# Patient Record
Sex: FEMALE | Race: WHITE | NOT HISPANIC OR LATINO | Employment: STUDENT | URBAN - METROPOLITAN AREA
[De-identification: names, ages, dates, MRNs, and addresses within clinical notes are randomized per-mention and may not be internally consistent; named-entity substitution may affect disease eponyms.]

---
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
## 2018-01-25 ENCOUNTER — EVALUATION (OUTPATIENT)
Dept: PHYSICAL THERAPY | Facility: CLINIC | Age: 18
End: 2018-01-25
Payer: COMMERCIAL

## 2018-01-25 DIAGNOSIS — M25.552 LEFT HIP PAIN: Primary | ICD-10-CM

## 2018-01-25 PROCEDURE — 97161 PT EVAL LOW COMPLEX 20 MIN: CPT | Performed by: PHYSICAL THERAPIST

## 2018-01-25 PROCEDURE — G8978 MOBILITY CURRENT STATUS: HCPCS | Performed by: PHYSICAL THERAPIST

## 2018-01-25 PROCEDURE — 97140 MANUAL THERAPY 1/> REGIONS: CPT | Performed by: PHYSICAL THERAPIST

## 2018-01-25 PROCEDURE — G8979 MOBILITY GOAL STATUS: HCPCS | Performed by: PHYSICAL THERAPIST

## 2018-01-25 RX ORDER — NORETHINDRONE ACETATE AND ETHINYL ESTRADIOL 1MG-20(21)
1 KIT ORAL DAILY
COMMUNITY

## 2018-01-25 NOTE — PROGRESS NOTES
PT Evaluation     Today's date: 2018  Patient name: Rosio Bermeo  : 2000  MRN: [id number]  Referring provider: Davonte Lei PT  Dx:   Encounter Diagnosis   Name Primary?  Left hip pain Yes                  Assessment  Impairments: abnormal muscle firing, abnormal or restricted ROM and impaired physical strength  Functional limitations: running > 2 miles without pain  Patient is irritable  Assessment details: Patient is a 21year old female who presents to physical therapy with left hip pain  Patient will benefit from skilled treatment intervention to resolve her impairments and to maximize function  Barriers to therapy: returns to school in 1 week  Understanding of Dx/Px/POC: excellent  Goals  Resolve pain with running > 2 miles    Plan  Patient would benefit from: skilled PT  Referral necessary: No  Planned modality interventions: thermotherapy: hydrocollator packs  Planned therapy interventions: joint mobilization, manual therapy, motor coordination training, neuromuscular re-education, therapeutic exercise and strengthening  Frequency: 3x week  Duration in visits: 12  Duration in weeks: 4  Treatment plan discussed with: patient        Subjective Evaluation    History of Present Illness  Date of onset: 2018  Mechanism of injury: Patient had a stress fracture of her left  Tibia in 2016  MRI of the left Tibia was performed in fall of   Recently she developed left hip with running for endurance training  She ran 2 miles and pain developed soon thereafter  Pain is described as sharp and located over the posterior thigh and glute  She is a mid-fielder in [employer]  Quality of life: excellent    Pain  Current pain ratin  At best pain ratin  At worst pain ratin      Diagnostic Tests  No diagnostic tests performed  Treatments  No previous or current treatments  Patient Goals  Patient goals for therapy: return to sport/leisure activities and decreased pain          Objective     Static Posture     Comments      Standing Posture: lumbar lordosis with right side bending noted   Sitting posture: right illiac crest is higher    Palpation: severe tenderness and spasm of bilateral psoas right > left,  Obturator internus tender on left    Active/Passive ROM  Lumbar AROM:flexion deviates to the right, extension hinges at L5  Hip:left hip IR <10 degrees      Joint Mobility  Hip:severe limitation in inferior, posterior and lateral mobility of the left hip  Innominate:right hypomobile    Manual Muscle Test  Hip: WNL    Motor Control  Hip Extension: WNL  Lower Extremity Integration Test - mild inefficiency b/l    Functional Tests - not tested today      Special Tests  Forward Flexion Test:right innominate hypomobile  Marchers test:right innominate hypomobile   Vertical Compression Test:3+/5  Slump Test: negative  Straight Leg Test: negative  Anterior Hip Impingement Test:positive on left  Khoa Test mild limitation on right  Prone Instability Test: positve at L4/5 L5/S1  Clint Test: right tighter than left   Muscle Length: piriformis tightness b/l      Precautions: none    Daily Treatment Diary     Manual              STM to bilateral psoas and left piriformis    Left hip IR stretch                                                                     Exercise Diary                           Standing piriformis stretch Left 2 min                                                                                                                                                                                                                                                          Modalities                                                               Flowsheet Rows    Flowsheet Row Most Recent Value   PT G-Codes   Current Score  79   Projected Score  90   FOTO information reviewed  Yes   Assessment Type  Evaluation   G code set  Mobility: Walking & Moving Around   Mobility: Walking and Moving Around Current Status ()  CJ   Mobility: Walking and Moving Around Goal Status ()  CJ

## 2018-01-25 NOTE — PROGRESS NOTES
Daily Note     Today's date: 2018  Patient name: Madeline Herrera  : 2000  MRN: [id number]  Referring provider: Robles Oakes PT  Dx:   Encounter Diagnosis   Name Primary?  Left hip pain Yes                  Subjective: see IE      Objective: See treatment diary below      Assessment: Tolerated treatment well  Patient could benefit from continued PT      Plan: Progress treatment as tolerated          Manual              STM to bilateral psoas and left piriformis   Left hip IR stretch                                                                   Exercise Diary                           Standing piriformis stretch Left 2 min                                                                                                                                                                                                                                                        Modalities

## 2018-01-26 ENCOUNTER — OFFICE VISIT (OUTPATIENT)
Dept: PHYSICAL THERAPY | Facility: CLINIC | Age: 18
End: 2018-01-26
Payer: COMMERCIAL

## 2018-01-26 DIAGNOSIS — M25.552 LEFT HIP PAIN: Primary | ICD-10-CM

## 2018-01-26 PROCEDURE — 97140 MANUAL THERAPY 1/> REGIONS: CPT | Performed by: PHYSICAL THERAPIST

## 2018-01-26 NOTE — PROGRESS NOTES
Daily Note     Today's date: 2018  Patient name: Willa Li  : 2000  MRN: [id number]  Referring provider: Rebeca Ruffin PT  Dx:   Encounter Diagnosis   Name Primary?  Left hip pain Yes            Precautions: none       Subjective: No significant change      Objective: See treatment diary below      Assessment: Tolerated treatment well  Patient exhibited good technqiue with therapeutic exercises  Reports significant improvement post tx      Plan: Progress treatment as tolerated           Daily Treatment Diary     Manual             STM to bilateral psoas and left piriformis    Left hip IR stretch  15 min                                                                   Exercise Diary                           Standing piriformis stretch Left 2 min Left 2 min                                                                                                                                                                                                                                                         Modalities              MHP  15 min

## 2018-01-29 ENCOUNTER — OFFICE VISIT (OUTPATIENT)
Dept: PHYSICAL THERAPY | Facility: CLINIC | Age: 18
End: 2018-01-29
Payer: COMMERCIAL

## 2018-01-29 DIAGNOSIS — M25.552 LEFT HIP PAIN: Primary | ICD-10-CM

## 2018-01-29 PROCEDURE — 97140 MANUAL THERAPY 1/> REGIONS: CPT | Performed by: PHYSICAL THERAPIST

## 2018-01-29 PROCEDURE — 97010 HOT OR COLD PACKS THERAPY: CPT | Performed by: PHYSICAL THERAPIST

## 2018-01-29 NOTE — PROGRESS NOTES
Daily Note     Today's date: 2018  Patient name: Chuck Magdaleno  : 2000  MRN: [id number]  Referring provider: Marah García PT  Dx:   Encounter Diagnosis   Name Primary?  Left hip pain Yes                  Subjective: Increased abdominal soreness after her initial treatment which began the next day  Reports decreased left hip pain post tx for 12 hours  Objective: See treatment diary below      Assessment: Tolerated treatment well  Patient exhibited good technqiue with therapeutic exercises      Plan: Progress treatment as tolerated         Daily Treatment Diary      Manual                   STM to bilateral psoas and left piriformis     Left hip IR stretch    STM to medial left knee/hamstring   15 min  20 min                                                                                                                       Exercise Diary                                                Standing piriformis stretch Left 2 min Left 2 min  left 2 min                                                                                                                                                                                                                                                                                                                                                                                                                                                                       Modalities                        MHP   15 min  15 min

## 2018-01-31 ENCOUNTER — OFFICE VISIT (OUTPATIENT)
Dept: PHYSICAL THERAPY | Facility: CLINIC | Age: 18
End: 2018-01-31
Payer: COMMERCIAL

## 2018-01-31 DIAGNOSIS — M25.552 LEFT HIP PAIN: Primary | ICD-10-CM

## 2018-01-31 PROCEDURE — 97010 HOT OR COLD PACKS THERAPY: CPT | Performed by: PHYSICAL THERAPIST

## 2018-01-31 PROCEDURE — 97140 MANUAL THERAPY 1/> REGIONS: CPT | Performed by: PHYSICAL THERAPIST

## 2018-01-31 PROCEDURE — 97110 THERAPEUTIC EXERCISES: CPT | Performed by: PHYSICAL THERAPIST

## 2018-01-31 NOTE — PROGRESS NOTES
Daily Note     Today's date: 2018  Patient name: Edilberto Irby  : 2000  MRN: [id number]  Referring provider: Claudette Eglin, PT  Dx:   Encounter Diagnosis   Name Primary?  Left hip pain Yes                  Subjective: Continued posterior left hip pain reported      Objective: See treatment diary below      Assessment: Tolerated treatment well  Patient exhibited good technqiue with therapeutic exercises      Plan: Progress treatment as tolerated         Daily Treatment Diary      Manual                 STM to bilateral psoas and left piriformis     Left hip IR stretch    STM to medial left knee/hamstring   15 min  20 min  20 min                                                                                                                     Exercise Diary                                                Standing piriformis stretch Left 2 min Left 2 min  left 2 min  2 min left                                        standing piriformis stretch       2 min each                                                                                                                                                                                                                                                                                                                                                                                                                      Modalities                        MHP   15 min  15 min  15 min

## 2018-02-02 ENCOUNTER — OFFICE VISIT (OUTPATIENT)
Dept: PHYSICAL THERAPY | Facility: CLINIC | Age: 18
End: 2018-02-02
Payer: COMMERCIAL

## 2018-02-02 DIAGNOSIS — M25.552 LEFT HIP PAIN: Primary | ICD-10-CM

## 2018-02-02 PROCEDURE — 97010 HOT OR COLD PACKS THERAPY: CPT | Performed by: PHYSICAL THERAPIST

## 2018-02-02 PROCEDURE — 97110 THERAPEUTIC EXERCISES: CPT | Performed by: PHYSICAL THERAPIST

## 2018-02-02 PROCEDURE — 97140 MANUAL THERAPY 1/> REGIONS: CPT | Performed by: PHYSICAL THERAPIST

## 2018-02-02 NOTE — PROGRESS NOTES
Daily Note     Today's date: 2018  Patient name: Theodora Steiner  : 2000  MRN: [id number]  Referring provider: Kristen Damon PT  Dx:   Encounter Diagnosis   Name Primary?  Left hip pain Yes                  Subjective: Continued posterior left hip pain reported      Objective: See treatment diary below      Assessment: Tolerated treatment well   Patient exhibited good technqiue with therapeutic exercises      Plan: Discharge to [facility] due to return to school    Daily Treatment Diary      Manual               STM to bilateral psoas and left piriformis     Left hip IR stretch    STM to medial left knee/hamstring   15 min  20 min  20 min  25 min                                                                                                                   Exercise Diary                                                Standing piriformis stretch Left 2 min Left 2 min  left 2 min  2 min left 2 min                                      prone RF stretch        2 min each   2 min                                                                                                                                                                                                                                                                                                                                                                                                                   Modalities                        MHP   15 min  15 min  15 min   15 min

## 2019-12-18 ENCOUNTER — EVALUATION (OUTPATIENT)
Dept: PHYSICAL THERAPY | Facility: CLINIC | Age: 19
End: 2019-12-18
Payer: COMMERCIAL

## 2019-12-18 ENCOUNTER — OFFICE VISIT (OUTPATIENT)
Dept: PHYSICAL THERAPY | Facility: CLINIC | Age: 19
End: 2019-12-18

## 2019-12-18 DIAGNOSIS — M79.671 PAIN IN BOTH FEET: Primary | ICD-10-CM

## 2019-12-18 DIAGNOSIS — M79.672 PAIN IN BOTH FEET: Primary | ICD-10-CM

## 2019-12-18 PROCEDURE — 97161 PT EVAL LOW COMPLEX 20 MIN: CPT | Performed by: PHYSICAL THERAPIST

## 2019-12-18 NOTE — PROGRESS NOTES
PT Evaluation     Today's date: 2019  Patient name: Gokul Good  : 2000  MRN: [id number]  Referring provider: Harrison Sawyer PT  Dx:   Encounter Diagnosis     ICD-10-CM    1  Pain in both feet M79 671     M79 672                   Assessment  Impairments: abnormal gait and pain with function    Goals  1  Fit orthotics  2  Improve gait  3  Improve LE alignment  4  IP in proper wear schedule    Plan  Patient would benefit from: orthotics and PT eval  Planned therapy interventions: orthotic fitting/training  Duration in visits: 2        Subjective    Pt presents for custom orthotics due to bilat foot pain  She had custom pair made about 3 yrs  Ago due to arch pain which helped significantly but they are in need of repair  She reports a recent onset of lat foot pain, 2/10  No other signif PMH      Objective     bilat pes planus  RCSP: everted  (-) windlass  Limited hallux DF PROM R > L  Mild TTP L 4th and 5th metatarsal  Pinch calluses noted bilat great toe  bilat PROM:  Inv wnl  Ev wnl  PF wnl  DF wnl    Hallux df wnl        Gait: excessive STJ pronation    casted for custom orthotics       Precautions: none      Manual                                                                                   Exercise Diary                                                                                                                                                                                                                                                                                      Modalities

## 2020-01-29 PROBLEM — R10.2 PELVIC PAIN IN FEMALE: Status: ACTIVE | Noted: 2019-12-26

## 2020-01-29 PROBLEM — M25.559 HIP PAIN: Status: ACTIVE | Noted: 2020-01-29

## 2020-01-29 PROBLEM — M67.00 SHORT ACHILLES TENDON: Status: ACTIVE | Noted: 2020-01-29

## 2020-01-29 PROBLEM — S76.019A STRAIN OF FLEXOR MUSCLE OF HIP: Status: ACTIVE | Noted: 2020-01-29

## 2020-01-29 PROBLEM — M21.40 PES PLANUS: Status: ACTIVE | Noted: 2020-01-29

## 2020-01-29 PROBLEM — R00.2 PALPITATIONS: Status: ACTIVE | Noted: 2017-06-13

## 2020-01-29 PROBLEM — L20.84 INTRINSIC ATOPIC DERMATITIS: Status: ACTIVE | Noted: 2020-01-29

## 2020-01-29 PROBLEM — N91.5 OLIGOMENORRHEA: Status: ACTIVE | Noted: 2018-05-24

## 2020-01-29 PROBLEM — J30.1 CHRONIC SEASONAL ALLERGIC RHINITIS DUE TO POLLEN: Status: ACTIVE | Noted: 2020-01-29

## 2020-01-29 PROBLEM — H10.13 ALLERGIC CONJUNCTIVITIS OF BOTH EYES: Status: ACTIVE | Noted: 2020-01-29

## 2020-01-29 PROBLEM — M79.606 PAIN IN LOWER LIMB: Status: ACTIVE | Noted: 2018-09-14

## 2020-01-29 PROBLEM — R42 DIZZINESS: Status: ACTIVE | Noted: 2017-06-13

## 2020-01-29 PROBLEM — S86.899A ANTERIOR SHIN SPLINTS: Status: ACTIVE | Noted: 2020-01-29

## 2020-01-29 PROBLEM — J30.89 ALLERGIC RHINITIS DUE TO HOUSE DUST MITE: Status: ACTIVE | Noted: 2020-01-29

## 2020-01-29 PROBLEM — K21.9 LARYNGOPHARYNGEAL REFLUX (LPR): Status: ACTIVE | Noted: 2020-01-29

## 2020-01-29 PROBLEM — N92.6 MENSTRUAL PROBLEM: Status: ACTIVE | Noted: 2018-11-20

## 2020-01-29 PROBLEM — Q66.50 CONGENITAL PES PLANUS: Status: ACTIVE | Noted: 2020-01-29

## 2020-01-29 PROBLEM — M84.369A STRESS FRACTURE OF TIBIA: Status: ACTIVE | Noted: 2018-09-14

## 2020-01-29 PROBLEM — S46.019A STRAIN OF ROTATOR CUFF CAPSULE: Status: ACTIVE | Noted: 2020-01-29

## 2020-02-05 ENCOUNTER — OFFICE VISIT (OUTPATIENT)
Dept: PHYSICAL THERAPY | Facility: CLINIC | Age: 20
End: 2020-02-05
Payer: COMMERCIAL

## 2020-02-05 DIAGNOSIS — M79.672 PAIN IN BOTH FEET: Primary | ICD-10-CM

## 2020-02-05 DIAGNOSIS — M79.671 PAIN IN BOTH FEET: Primary | ICD-10-CM

## 2020-02-05 PROCEDURE — L3010 FOOT LONGITUDINAL ARCH SUPPO: HCPCS | Performed by: PHYSICAL THERAPIST

## 2020-02-07 ENCOUNTER — APPOINTMENT (OUTPATIENT)
Dept: LAB | Facility: CLINIC | Age: 20
End: 2020-02-07
Payer: COMMERCIAL

## 2020-02-07 ENCOUNTER — TRANSCRIBE ORDERS (OUTPATIENT)
Dept: LAB | Facility: CLINIC | Age: 20
End: 2020-02-07

## 2020-02-07 DIAGNOSIS — K21.9 LARYNGOPHARYNGEAL REFLUX (LPR): ICD-10-CM

## 2020-02-07 DIAGNOSIS — K90.41 GLUTEN INTOLERANCE: ICD-10-CM

## 2020-02-07 PROCEDURE — 82784 ASSAY IGA/IGD/IGG/IGM EACH: CPT

## 2020-02-07 PROCEDURE — 86255 FLUORESCENT ANTIBODY SCREEN: CPT

## 2020-02-07 PROCEDURE — 83516 IMMUNOASSAY NONANTIBODY: CPT

## 2020-02-07 PROCEDURE — 36415 COLL VENOUS BLD VENIPUNCTURE: CPT

## 2020-02-07 PROCEDURE — 86003 ALLG SPEC IGE CRUDE XTRC EA: CPT

## 2020-02-08 LAB
ENDOMYSIUM IGA SER QL: NEGATIVE
GLIADIN PEPTIDE IGA SER-ACNC: 2 UNITS (ref 0–19)
GLIADIN PEPTIDE IGG SER-ACNC: 2 UNITS (ref 0–19)
IGA SERPL-MCNC: 136 MG/DL (ref 87–352)
TTG IGA SER-ACNC: <2 U/ML (ref 0–3)
TTG IGG SER-ACNC: <2 U/ML (ref 0–5)

## 2020-02-14 LAB — MISCELLANEOUS LAB TEST RESULT: NORMAL

## 2020-03-30 PROBLEM — K90.41 NCGS (NON-CELIAC GLUTEN SENSITIVITY): Status: ACTIVE | Noted: 2020-03-30

## 2021-03-29 ENCOUNTER — TELEPHONE (OUTPATIENT)
Dept: FAMILY MEDICINE CLINIC | Facility: CLINIC | Age: 21
End: 2021-03-29

## 2021-03-29 NOTE — TELEPHONE ENCOUNTER
Left message for pt - Dr Lucila Bermudez is listed as a PCP  Inquired whether or not pt would like to continue with [facility] with Dr Lucila Bermudez as PCP or if pt established elsewhere

## 2021-04-13 DIAGNOSIS — Z23 ENCOUNTER FOR IMMUNIZATION: ICD-10-CM

## 2021-05-25 ENCOUNTER — TELEPHONE (OUTPATIENT)
Dept: FAMILY MEDICINE CLINIC | Facility: CLINIC | Age: 21
End: 2021-05-25

## 2021-07-30 ENCOUNTER — OFFICE VISIT (OUTPATIENT)
Dept: PHYSICAL THERAPY | Facility: CLINIC | Age: 21
End: 2021-07-30

## 2021-07-30 DIAGNOSIS — S86.899A ANTERIOR SHIN SPLINTS: Primary | ICD-10-CM

## 2022-02-23 ENCOUNTER — TELEPHONE (OUTPATIENT)
Dept: FAMILY MEDICINE CLINIC | Facility: CLINIC | Age: 22
End: 2022-02-23

## 2022-02-23 NOTE — TELEPHONE ENCOUNTER
Patient has established with a new provider Chadwick Boyle at [address] and [facility] [address] [address] [phone]  Please remove Dr Dafne Nunez as patient's PCP  Thank You

## 2022-05-27 NOTE — TELEPHONE ENCOUNTER
05/27/22 1:40 PM     Thank you for your request  Your request has been received, reviewed, and the patient chart updated  The PCP has successfully been removed with a patient attribution note  This message will now be completed      Thank you  Matthews Primrose

## 2024-02-21 PROBLEM — H10.13 ALLERGIC CONJUNCTIVITIS OF BOTH EYES: Status: RESOLVED | Noted: 2020-01-29 | Resolved: 2024-02-21

## 2024-11-08 ENCOUNTER — EVALUATION (OUTPATIENT)
Dept: PHYSICAL THERAPY | Facility: MEDICAL CENTER | Age: 24
End: 2024-11-08
Payer: COMMERCIAL

## 2024-11-08 DIAGNOSIS — S76.012D STRAIN OF MUSCLE, FASCIA AND TENDON OF LEFT HIP, SUBSEQUENT ENCOUNTER: Primary | ICD-10-CM

## 2024-11-08 DIAGNOSIS — M25.552 LEFT HIP PAIN: ICD-10-CM

## 2024-11-08 PROCEDURE — 97161 PT EVAL LOW COMPLEX 20 MIN: CPT | Performed by: PHYSICAL THERAPIST

## 2024-11-08 NOTE — LETTER
2024    Nora Stanton MD  [address] [place] [id number]    Patient: Tory Christine   YOB: 2000   Date of Visit: 2024     Encounter Diagnosis     ICD-10-CM    1. Strain of muscle, fascia and tendon of left hip, subsequent encounter  S76.012D       2. Left hip pain  M25.552           Dear Dr. Stanton:    Thank you for your recent referral of Tory Christine. Please review the attached evaluation summary from Tory's recent visit.     Please verify that you agree with the plan of care by signing the attached order.     If you have any questions or concerns, please do not hesitate to call.     I sincerely appreciate the opportunity to share in the care of one of your patients and hope to have another opportunity to work with you in the near future.       Sincerely,    Becca Powell, PT      Referring Provider:      I certify that I have read the below Plan of Care and certify the need for these services furnished under this plan of treatment while under my care.                    Nora Stanton MD  [address] [place] [address]  Via Fax: [phone]          PT Evaluation     Today's date: 2024  Patient name: Tory Christine  : 2000  MRN: [id number]  Referring provider: Nora Stanton MD  Dx:   Encounter Diagnosis     ICD-10-CM    1. Strain of muscle, fascia and tendon of left hip, subsequent encounter  S76.012D       2. Left hip pain  M25.552                      Assessment  Impairments: abnormal muscle firing, abnormal muscle tone, abnormal or restricted ROM, activity intolerance, impaired physical strength, lacks appropriate home exercise program, pain with function, participation limitations and activity limitations  Functional limitations: sitting, standing, exercise routine  Symptom irritability: low    Assessment details: Toyr Christine is a pleasant 23 y.o. female who presents with chronic L  glute and anterior hip pain for the last 6 months of a gradual onset. No further referral is necessary at this time based upon examination results.    Primary movement impairment is L posterior innominate, which results in signs and symptoms consistent with L SIJ dysfunction, and limits her ability to sit and stand for prolonged periods. Patient also presents with L hip IR hypomobility, which further contributes to compensatory stress on her L SIJ when performing exercise routine. In addition, L hip girdle weakness also limits her functional activity tolerance. Patient responded well to manual therapy with no LLD present post-tx, and she reported decreased tightness when ambulating post-tx. Patient was educated in an illustrated HEP for hip girdle stability and was able to complete exercises without pain. Patient would benefit from skilled PT services to address the listed impairments to facilitate a return to PLOF. Thank you for the referral.     Barriers to therapy: none  Understanding of Dx/Px/POC: good     Prognosis: good  Prognosis details: Positive prognostic factors include positive attitude towards recovery. Negative prognostic factors include chronicity of symptoms.    Goals  STG:  Patient will be independent with home exercise program.   Patient will increase L hip IR PROM to be comparable to the contralateral side to reduce stress on L SIJ when exercising.  LTG:  Patient will increase L hip strength to at least 4+/5 in all planes to be able to participate in CrossFit and [employer].  Patient will be able to sit for longer periods.  Patient will be able to stand for longer periods.  Patient will be able to manage symptoms independently.     Plan  Patient would benefit from: skilled physical therapy  Referral necessary: No  Planned modality interventions: cryotherapy and thermotherapy: hydrocollator packs    Planned therapy interventions: abdominal trunk stabilization, activity modification, IASTM, joint  mobilization, kinesiology taping, massage, manual therapy, Mahmood taping, motor coordination training, neuromuscular re-education, patient/caregiver education, postural training, strengthening, stretching, therapeutic activities, therapeutic exercise, home exercise program, graded exercise, graded activity, functional ROM exercises, flexibility, body mechanics training and balance    Frequency: 2x week (tapering to 1x/week)  Duration in weeks: 6  Plan of Care beginning date: 2024  Plan of Care expiration date: 2024  Treatment plan discussed with: patient  Plan details: Prognosis is above given PT services 2x/week tapering to 1x/week for 6 additional weeks and given HEP adherence.    Subjective Evaluation    History of Present Illness  Mechanism of injury: This is a 23 y.o. female presenting with L glute pain for the last 6 months. She reports no history of falls or trauma. She does note that she feels her gait is off when walking and running since being on crutches for many months when healing from a L tibial stress fracture 8 years ago. Her current pain is located in her L glute and has been radiating into the L side of her low back and into her L hip flexor region at times. She is able to continue CrossFit and [employer]rox, but she has some soreness after exercise.                 Not a recurrent problem   Quality of life: excellent    Patient Goals  Patient goals for therapy: decreased pain, increased strength and return to sport/leisure activities  Patient goal: to be able to sit, stand, exercise  Pain  Current pain rating: 3  At best pain ratin  At worst pain ratin  Location: L glut/L lower back/L anterior hip  Quality: tight, radiating and pulling  Alleviating factors: movement.  Aggravating factors: sitting and standing (deadlifts, squats, lunges)        Objective     Active Range of Motion     Lumbar   Flexion:  WFL  Extension: Active lumbar extension: pain in L glute.  WFL and with pain  Left  lateral flexion:  WFL  Right lateral flexion:  WFL  Left rotation:  WFL  Right rotation:  WFL  Left Hip   External rotation (90/90): 45 degrees   Internal rotation (90/90): 15 degrees     Right Hip   External rotation (90/90): 45 degrees   Internal rotation (90/90): 35 degrees     Passive Range of Motion   Left Hip   External rotation (90/90): 50 degrees   External rotation (prone): 55 degrees   Internal rotation (90/90): 20 degrees   Internal rotation (prone): 20 degrees     Right Hip   External rotation (90/90): 50 degrees   External rotation (prone): 55 degrees   Internal rotation (90/90): 45 degrees   Internal rotation (prone): 45 degrees     Strength/Myotome Testing     Left Hip   Planes of Motion   Flexion: 4+  Extension: 3+  Abduction: 3+  External rotation: 3+  Internal rotation: 2+ (due to lack of full L hip IR AROM)    Isolated Muscles   Gluteus otis: 3+    Right Hip   Planes of Motion   Flexion: 5  Extension: 4+  Abduction: 4  External rotation: 4+  Internal rotation: 4+    Isolated Muscles   Gluteus maximums: 4+    Tests     Lumbar     Left   Negative passive SLR.     Right   Negative passive SLR.     Left Hip   Positive long sit (L leg shorter supine).   Negative AJAY, FADIR and scour.   SLR: Negative.     Right Hip   Positive long sit (R leg longer supine).   Negative AJAY, FADIR and scour.   SLR: Negative.     Functional Assessment        Single Leg Squat   Left Leg  Left trunk side bending, sitting toward left side and valgus.     Right Leg  Sitting toward right side.     Single Leg Stance   Left: 10 seconds  Right: 10 seconds           Precautions: none    HEP: GS, bridges, supine clams (GTB), supine hip ADD isometrics, seated heel squeezes  Manuals 11/8            L hip LAD KP Gr. V            L hip inferolateral glides KP Gr. III                                      Neuro Re-Ed                                                                                                        Ther Ex        "      Rec bike MEDEL NV            Hooklying GS 5\"x30 HEP            Bridges 5\"x30 HEP            Supine clams 5\"x30 GTB HEP            Supine hip ADD isometrics 5\"x30 HEP            Seated heel squeezes 5\"x30 HEP            Prone heel squeezes NV            Standing hip ABD             Ther Activity                                       Gait Training                                       Modalities             MHP L hip PRN                                                "

## 2024-11-08 NOTE — PROGRESS NOTES
PT Evaluation     Today's date: 2024  Patient name: Tory Christine  : 2000  MRN: [id number]  Referring provider: Nora Stanton MD  Dx:   Encounter Diagnosis     ICD-10-CM    1. Strain of muscle, fascia and tendon of left hip, subsequent encounter  S76.012D       2. Left hip pain  M25.552                      Assessment  Impairments: abnormal muscle firing, abnormal muscle tone, abnormal or restricted ROM, activity intolerance, impaired physical strength, lacks appropriate home exercise program, pain with function, participation limitations and activity limitations  Functional limitations: sitting, standing, exercise routine  Symptom irritability: low    Assessment details: Tory Christine is a pleasant 23 y.o. female who presents with chronic L glute and anterior hip pain for the last 6 months of a gradual onset. No further referral is necessary at this time based upon examination results.    Primary movement impairment is L posterior innominate, which results in signs and symptoms consistent with L SIJ dysfunction, and limits her ability to sit and stand for prolonged periods. Patient also presents with L hip IR hypomobility, which further contributes to compensatory stress on her L SIJ when performing exercise routine. In addition, L hip girdle weakness also limits her functional activity tolerance. Patient responded well to manual therapy with no LLD present post-tx, and she reported decreased tightness when ambulating post-tx. Patient was educated in an illustrated HEP for hip girdle stability and was able to complete exercises without pain. Patient would benefit from skilled PT services to address the listed impairments to facilitate a return to PLOF. Thank you for the referral.     Barriers to therapy: none  Understanding of Dx/Px/POC: good     Prognosis: good  Prognosis details: Positive prognostic factors include positive attitude towards recovery. Negative prognostic factors  include chronicity of symptoms.    Goals  STG:  Patient will be independent with home exercise program.   Patient will increase L hip IR PROM to be comparable to the contralateral side to reduce stress on L SIJ when exercising.  LTG:  Patient will increase L hip strength to at least 4+/5 in all planes to be able to participate in Cross[employer] and [employer].  Patient will be able to sit for longer periods.  Patient will be able to stand for longer periods.  Patient will be able to manage symptoms independently.     Plan  Patient would benefit from: skilled physical therapy  Referral necessary: No  Planned modality interventions: cryotherapy and thermotherapy: hydrocollator packs    Planned therapy interventions: abdominal trunk stabilization, activity modification, IASTM, joint mobilization, kinesiology taping, massage, manual therapy, Mahmood taping, motor coordination training, neuromuscular re-education, patient/caregiver education, postural training, strengthening, stretching, therapeutic activities, therapeutic exercise, home exercise program, graded exercise, graded activity, functional ROM exercises, flexibility, body mechanics training and balance    Frequency: 2x week (tapering to 1x/week)  Duration in weeks: 6  Plan of Care beginning date: 11/8/2024  Plan of Care expiration date: 12/20/2024  Treatment plan discussed with: patient  Plan details: Prognosis is above given PT services 2x/week tapering to 1x/week for 6 additional weeks and given HEP adherence.    Subjective Evaluation    History of Present Illness  Mechanism of injury: This is a 23 y.o. female presenting with L glute pain for the last 6 months. She reports no history of falls or trauma. She does note that she feels her gait is off when walking and running since being on crutches for many months when healing from a L tibial stress fracture 8 years ago. Her current pain is located in her L glute and has been radiating into the L side of her low back and  into her L hip flexor region at times. She is able to continue CrossFit and [employer], but she has some soreness after exercise.                 Not a recurrent problem   Quality of life: excellent    Patient Goals  Patient goals for therapy: decreased pain, increased strength and return to sport/leisure activities  Patient goal: to be able to sit, stand, exercise  Pain  Current pain rating: 3  At best pain ratin  At worst pain ratin  Location: L glut/L lower back/L anterior hip  Quality: tight, radiating and pulling  Alleviating factors: movement.  Aggravating factors: sitting and standing (deadlifts, squats, lunges)        Objective     Active Range of Motion     Lumbar   Flexion:  WFL  Extension: Active lumbar extension: pain in L glute.  WFL and with pain  Left lateral flexion:  WFL  Right lateral flexion:  WFL  Left rotation:  WFL  Right rotation:  WFL  Left Hip   External rotation (90/90): 45 degrees   Internal rotation (90/90): 15 degrees     Right Hip   External rotation (90/90): 45 degrees   Internal rotation (90/90): 35 degrees     Passive Range of Motion   Left Hip   External rotation (90/90): 50 degrees   External rotation (prone): 55 degrees   Internal rotation (90/90): 20 degrees   Internal rotation (prone): 20 degrees     Right Hip   External rotation (90/90): 50 degrees   External rotation (prone): 55 degrees   Internal rotation (90/90): 45 degrees   Internal rotation (prone): 45 degrees     Strength/Myotome Testing     Left Hip   Planes of Motion   Flexion: 4+  Extension: 3+  Abduction: 3+  External rotation: 3+  Internal rotation: 2+ (due to lack of full L hip IR AROM)    Isolated Muscles   Gluteus otis: 3+    Right Hip   Planes of Motion   Flexion: 5  Extension: 4+  Abduction: 4  External rotation: 4+  Internal rotation: 4+    Isolated Muscles   Gluteus maximums: 4+    Tests     Lumbar     Left   Negative passive SLR.     Right   Negative passive SLR.     Left Hip   Positive long sit (L leg  "shorter supine).   Negative AJAY, FADIR and scour.   SLR: Negative.     Right Hip   Positive long sit (R leg longer supine).   Negative AJAY, FADIR and scour.   SLR: Negative.     Functional Assessment        Single Leg Squat   Left Leg  Left trunk side bending, sitting toward left side and valgus.     Right Leg  Sitting toward right side.     Single Leg Stance   Left: 10 seconds  Right: 10 seconds           Precautions: none    HEP: GS, bridges, supine clams (GTB), supine hip ADD isometrics, seated heel squeezes  Manuals 11/8            L hip LAD KP Gr. V            L hip inferolateral glides KP Gr. III                                      Neuro Re-Ed                                                                                                        Ther Ex             Rec bike MEDEL NV            Hooklying GS 5\"x30 HEP            Bridges 5\"x30 HEP            Supine clams 5\"x30 GTB HEP            Supine hip ADD isometrics 5\"x30 HEP            Seated heel squeezes 5\"x30 HEP            Prone heel squeezes NV            Standing hip ABD             Ther Activity                                       Gait Training                                       Modalities             MHP L hip PRN                               "

## 2024-11-11 ENCOUNTER — OFFICE VISIT (OUTPATIENT)
Dept: PHYSICAL THERAPY | Facility: MEDICAL CENTER | Age: 24
End: 2024-11-11
Payer: COMMERCIAL

## 2024-11-11 DIAGNOSIS — S76.012D STRAIN OF MUSCLE, FASCIA AND TENDON OF LEFT HIP, SUBSEQUENT ENCOUNTER: Primary | ICD-10-CM

## 2024-11-11 DIAGNOSIS — M25.552 LEFT HIP PAIN: ICD-10-CM

## 2024-11-11 PROCEDURE — 97140 MANUAL THERAPY 1/> REGIONS: CPT | Performed by: PHYSICAL THERAPIST

## 2024-11-11 NOTE — PROGRESS NOTES
"Daily Note     Today's date: 2024  Patient name: Tory Christine  : 2000  MRN: [id number]  Referring provider: Nora Stanton MD  Dx:   Encounter Diagnosis     ICD-10-CM    1. Strain of muscle, fascia and tendon of left hip, subsequent encounter  S76.012D       2. Left hip pain  M25.552                      Subjective: Patient reports that she felt significant relief after last session with less pain. However, she has some tightness and discomfort again today.      Objective: See treatment diary below    Pre-tx: (+) LLD (L leg shorter supine)  Post-tx: (-) LLD    Assessment: Patient presented with LLD pre-tx today (L leg shorter supine) that resolved after L hip LAD. She also responded well to L hip inferolateral glides with an improvement in L hip IR PROM post-tx compared to presentation. Initiated hip girdle stability program as outlined below. LLD returned after exercise, but this resolved again after L hip LAD. No LLD present post-tx. Patient reported decreased intensity of symptoms post-tx. She tolerated treatment well and completed program in a pain-free range. Patient would benefit from continued PT to address impairments to maximize function.      Plan: Continue per plan of care.      Precautions: none    HEP: GS, bridges, supine clams (blue), supine hip ADD isometrics, seated heel squeezes  Manuals            L hip LAD KP Gr. V KP Gr. V (x2)           L hip inferolateral glides KP Gr. III KP Gr. III-IV                                       Neuro Re-Ed                                                                                                        Ther Ex             Rec bike MEDEL NV 5'           Hooklying GS 5\"x30 HEP 5\"x30           Bridges 5\"x30 HEP 5\"x30           Supine clams 5\"x30 GTB HEP 5\"x30 blue           Supine hip ADD isometrics 5\"x30 HEP 5\"x30           Seated heel squeezes 5\"x30 HEP 5\"x30           Prone heel squeezes NV 5\"x30           Standing hip ABD      "        Ther Activity                                       Gait Training                                       Modalities             MHP L hip PRN

## 2024-11-14 ENCOUNTER — OFFICE VISIT (OUTPATIENT)
Dept: PHYSICAL THERAPY | Facility: MEDICAL CENTER | Age: 24
End: 2024-11-14
Payer: COMMERCIAL

## 2024-11-14 DIAGNOSIS — S76.012D STRAIN OF MUSCLE, FASCIA AND TENDON OF LEFT HIP, SUBSEQUENT ENCOUNTER: Primary | ICD-10-CM

## 2024-11-14 DIAGNOSIS — M25.552 LEFT HIP PAIN: ICD-10-CM

## 2024-11-14 PROCEDURE — 97140 MANUAL THERAPY 1/> REGIONS: CPT | Performed by: PHYSICAL THERAPIST

## 2024-11-14 NOTE — PROGRESS NOTES
Daily Note     Today's date: 2024  Patient name: Tory Christine  : 2000  MRN: [id number]  Referring provider: Nora Stanton MD  Dx:   Encounter Diagnosis     ICD-10-CM    1. Strain of muscle, fascia and tendon of left hip, subsequent encounter  S76.012D       2. Left hip pain  M25.552                      Subjective: Patient reports that she had a lot of tightness in her hip for the last 2 days, but she is feeling good today.      Objective: See treatment diary below    Pre-tx: (+) LLD (L leg shorter supine)  Post-tx: (-) LLD    Assessment: Patient presented with LLD pre-tx today (L leg shorter in supine) that resolved after L hip LAD. Continued with L hip inferolateral glides to address L hip IR hypomobility, and patient demonstrated improvements in L hip IR PROM post-tx. Also added percussive STM to L gluts/lower lumbar paraspinals to reduce tightness. Patient reported mild L hip flexor discomfort when ambulating after inferolateral glides/percussive STM, but this resolved after addition of Gr. IV L hip LAD. Continued with hip stability program, and held progressions due to report of recent soreness. LLD was present after TE but was resolved again after repeat L hip LAD. No LLD present post-tx, and patient reported alleviation of symptoms when ambulating at the end of the session. Patient tolerated treatment well, demonstrated appropriate fatigue post-tx, and completed program without pain. Patient would benefit from continued PT to address impairments to maximize function.      Plan: Continue per plan of care.      Precautions: none    HEP: GS, bridges, supine clams (blue), supine hip ADD isometrics, seated heel squeezes  Manuals           L hip LAD KP Gr. V KP Gr. V (x2) KP Gr. IV, Gr. V (X2)          L hip inferolateral glides KP Gr. III KP Gr. III-IV KP Gr. III-IV          Percussive STM to L gluts/lower lumbar paraspinals                            Neuro Re-Ed         "                                                                                                Ther Ex             Rec bike MEDEL NV 5' 5'          Hooklying GS 5\"x30 HEP 5\"x30 HEP          Bridges 5\"x30 HEP 5\"x30 5\"x30 (x2)          Supine clams 5\"x30 GTB HEP 5\"x30 blue 5\"x30 blue          Supine hip ADD isometrics 5\"x30 HEP 5\"x30 5\"x30          Seated heel squeezes 5\"x30 HEP 5\"x30 HEP          Prone heel squeezes NV 5\"x30 5\"x30          Seated piriformis stretch   30\"x3          Standing hip ABD             Ther Activity                                       Gait Training                                       Modalities             MHP L hip PRN                                 "

## 2024-11-18 ENCOUNTER — OFFICE VISIT (OUTPATIENT)
Dept: PHYSICAL THERAPY | Facility: MEDICAL CENTER | Age: 24
End: 2024-11-18
Payer: COMMERCIAL

## 2024-11-18 DIAGNOSIS — S76.012D STRAIN OF MUSCLE, FASCIA AND TENDON OF LEFT HIP, SUBSEQUENT ENCOUNTER: Primary | ICD-10-CM

## 2024-11-18 DIAGNOSIS — M25.552 LEFT HIP PAIN: ICD-10-CM

## 2024-11-18 PROCEDURE — 97140 MANUAL THERAPY 1/> REGIONS: CPT | Performed by: PHYSICAL THERAPIST

## 2024-11-18 NOTE — PROGRESS NOTES
Daily Note     Today's date: 2024  Patient name: Tory Christine  : 2000  MRN: [id number]  Referring provider: Nora Stanton MD  Dx:   Encounter Diagnosis     ICD-10-CM    1. Strain of muscle, fascia and tendon of left hip, subsequent encounter  S76.012D       2. Left hip pain  M25.552                      Subjective: Patient reports that she felt good after her last PT session with no soreness afterwards. However, she has been having a lot of soreness in her L glut/low back that started yesterday. She is also having some pain in her L groin today.      Objective: See treatment diary below    Pre-tx: (+) LLD (L leg shorter supine)  Post-tx: (-) LLD    Assessment: Patient presented with very mild LLD (L leg shorter in supine). She responded well to L hip LAD with no LLD present post-tx. L hip IR PROM is steadily improving each session, but she continues to demonstrate limitations in L hip IR PROM in prone when compared to the contralateral side. She responded well to L hip inferolateral glides with an improvement in IR PROM post-tx. Continued with gentle hip stability program and held progressions due to baseline soreness. MHP also applied post-tx to further minimize muscular soreness. Patient tolerated treatment well and completed program without pain. Patient would benefit from continued PT to progress treatment as appropriate to achieve goals.       Plan: Continue per plan of care.      Precautions: none    HEP: GS, bridges, supine clams (blue), supine hip ADD isometrics, seated heel squeezes  Manuals          L hip LAD KP Gr. V KP Gr. V (x2) KP Gr. IV, Gr. V (X2) KP Gr. IV, Gr. V (x1)         L hip inferolateral glides KP Gr. III KP Gr. III-IV KP Gr. III-IV KP Gr. III-IV         Percussive STM to L gluts/lower lumbar paraspinals   KP np                        Neuro Re-Ed                                                                                                     "    Ther Ex             Rec bike MEDEL NV 5' 5' 5'         Hooklying GS 5\"x30 HEP 5\"x30 HEP HEP         Bridges 5\"x30 HEP 5\"x30 5\"x30 (x2) 5\"x30         Supine clams 5\"x30 GTB HEP 5\"x30 blue 5\"x30 blue 5\"x30 blue         Supine hip ADD isometrics 5\"x30 HEP 5\"x30 5\"x30 5\"x30         Seated heel squeezes 5\"x30 HEP 5\"x30 HEP HEP         Prone heel squeezes NV 5\"x30 5\"x30 5\"x30         Seated piriformis stretch   30\"x3          Standing hip ABD             Ther Activity                                       Gait Training                                       Modalities             MHP L hip PRN    X10' in R s/l post tx                               "

## 2024-11-22 ENCOUNTER — OFFICE VISIT (OUTPATIENT)
Dept: PHYSICAL THERAPY | Facility: MEDICAL CENTER | Age: 24
End: 2024-11-22
Payer: COMMERCIAL

## 2024-11-22 DIAGNOSIS — M25.552 LEFT HIP PAIN: ICD-10-CM

## 2024-11-22 DIAGNOSIS — S76.012D STRAIN OF MUSCLE, FASCIA AND TENDON OF LEFT HIP, SUBSEQUENT ENCOUNTER: Primary | ICD-10-CM

## 2024-11-22 PROCEDURE — 97140 MANUAL THERAPY 1/> REGIONS: CPT | Performed by: PHYSICAL THERAPIST

## 2024-11-22 NOTE — PROGRESS NOTES
"Daily Note     Today's date: 2024  Patient name: Tory Christine  : 2000  MRN: [id number]  Referring provider: Nora Stanton MD  Dx:   Encounter Diagnosis     ICD-10-CM    1. Strain of muscle, fascia and tendon of left hip, subsequent encounter  S76.012D       2. Left hip pain  M25.552                      Subjective: Patient reports that she felt really good over the last few days with less pain in her hip, but she is having some soreness again today.      Objective: See treatment diary below    Pre-tx: (+) LLD (L leg shorter supine)  Post-tx: (-) LLD    Assessment: Patient presented with very mild LLD (L leg shorter in supine). Patient responded well to L hip LAD with no LLD present post-tx. L hip IR PROM also continues to improve each session. Able to increase reps for supine hip ADD isometrics and resistance for supine clams, which further demonstrates good progress toward goals. Patient was given tband for supine clams during HEP performance. Patient tolerated treatment well and completed program in a pain-free range. Patient would benefit from continued PT to further progress treatment to achieve goals.      Plan: Continue per plan of care.      Precautions: none    HEP: GS, bridges, supine clams (blue), supine hip ADD isometrics, seated heel squeezes  Manuals         L hip LAD KP Gr. V KP Gr. V (x2) KP Gr. IV, Gr. V (X2) KP Gr. IV, Gr. V (x1) KP Gr. IV, Gr. V (x1)        L hip inferolateral glides KP Gr. III KP Gr. III-IV KP Gr. III-IV KP Gr. III-IV KP Gr. III-IV        Percussive STM to L gluts/lower lumbar paraspinals   KP np                        Neuro Re-Ed                                                                                                        Ther Ex             Rec bike MEDEL NV 5' 5' 5' 5'        Hooklying GS 5\"x30 HEP 5\"x30 HEP HEP HEP        Bridges 5\"x30 HEP 5\"x30 5\"x30 (x2) 5\"x30 5\"x30        Supine clams 5\"x30 GTB HEP 5\"x30 blue " "5\"x30 blue 5\"x30 blue 5\"x30 black        Supine hip ADD isometrics 5\"x30 HEP 5\"x30 5\"x30 5\"x30 5\"x35        Seated heel squeezes 5\"x30 HEP 5\"x30 HEP HEP         Prone heel squeezes NV 5\"x30 5\"x30 5\"x30 5\"x35        Seated piriformis stretch   30\"x3          Standing hip ABD             Ther Activity                                       Gait Training                                       Modalities             MHP L hip PRN    X10' in R s/l post tx X10' in R s/l post tx                                "

## 2024-11-25 ENCOUNTER — OFFICE VISIT (OUTPATIENT)
Dept: PHYSICAL THERAPY | Facility: MEDICAL CENTER | Age: 24
End: 2024-11-25
Payer: COMMERCIAL

## 2024-11-25 DIAGNOSIS — S76.012D STRAIN OF MUSCLE, FASCIA AND TENDON OF LEFT HIP, SUBSEQUENT ENCOUNTER: Primary | ICD-10-CM

## 2024-11-25 DIAGNOSIS — M25.552 LEFT HIP PAIN: ICD-10-CM

## 2024-11-25 PROCEDURE — 97140 MANUAL THERAPY 1/> REGIONS: CPT | Performed by: PHYSICAL THERAPIST

## 2024-11-25 NOTE — PROGRESS NOTES
"Daily Note     Today's date: 2024  Patient name: Tory Christine  : 2000  MRN: [id number]  Referring provider: Nora Stanton MD  Dx:   Encounter Diagnosis     ICD-10-CM    1. Strain of muscle, fascia and tendon of left hip, subsequent encounter  S76.012D       2. Left hip pain  M25.552                      Subjective: Patient reports that she felt relief after her last PT session, but she is having some tightness in her hip today.      Objective: See treatment diary below      Assessment: Patient presented with very mild LLD pre-tx (L leg shorter in supine). She responded well to L hip LAD with no LLD present post-tx. She also presented with L hip IR hypomobility today compared to last session. IR PROM improved after L hip inferolateral glides. Able to progress reps for all hip stability TE, which demonstrates good progress toward goals. Added half kneel hip flexor stretching to further improve mobility. Patient tolerated treatment well and completed program without pain. Patient would benefit from continued PT to progress treatment as appropriate to achieve goals.      Plan: Continue per plan of care.      Precautions: none    HEP: GS, bridges, supine clams (blue), supine hip ADD isometrics, seated heel squeezes  Manuals        L hip LAD KP Gr. V KP Gr. V (x2) KP Gr. IV, Gr. V (X2) KP Gr. IV, Gr. V (x1) KP Gr. IV, Gr. V (x1) KP Gr. IV, KP Gr. V (x1)       L hip inferolateral glides KP Gr. III KP Gr. III-IV KP Gr. III-IV KP Gr. III-IV KP Gr. III-IV KP Gr. III-IV       Percussive STM to L gluts/lower lumbar paraspinals   KP np                        Neuro Re-Ed                                                                                                        Ther Ex             Rec bike MEDEL NV 5' 5' 5' 5' 5'       Hooklying GS 5\"x30 HEP 5\"x30 HEP HEP HEP        Bridges 5\"x30 HEP 5\"x30 5\"x30 (x2) 5\"x30 5\"x30 5\"x35       Supine clams 5\"x30 GTB HEP 5\"x30 blue " "5\"x30 blue 5\"x30 blue 5\"x30 black 5\"x35 black       Supine hip ADD isometrics 5\"x30 HEP 5\"x30 5\"x30 5\"x30 5\"x35 5\"x35       Seated heel squeezes 5\"x30 HEP 5\"x30 HEP HEP         Prone heel squeezes NV 5\"x30 5\"x30 5\"x30 5\"x35 5\"x35       Seated piriformis stretch   30\"x3          Half kneeling hip flexor stretch      2\"x10 b/l       Ther Activity                                       Gait Training                                       Modalities             MHP L hip PRN    X10' in R s/l post tx X10' in R s/l post tx X10' in R s/l post tx                                 "

## 2024-11-29 ENCOUNTER — OFFICE VISIT (OUTPATIENT)
Dept: PHYSICAL THERAPY | Facility: MEDICAL CENTER | Age: 24
End: 2024-11-29
Payer: COMMERCIAL

## 2024-11-29 DIAGNOSIS — S76.012D STRAIN OF MUSCLE, FASCIA AND TENDON OF LEFT HIP, SUBSEQUENT ENCOUNTER: Primary | ICD-10-CM

## 2024-11-29 DIAGNOSIS — M25.552 LEFT HIP PAIN: ICD-10-CM

## 2024-11-29 PROCEDURE — 97140 MANUAL THERAPY 1/> REGIONS: CPT | Performed by: PHYSICAL THERAPIST

## 2024-11-29 NOTE — PROGRESS NOTES
Daily Note     Today's date: 2024  Patient name: Tory Christine  : 2000  MRN: [id number]  Referring provider: Nora Stanton MD  Dx:   Encounter Diagnosis     ICD-10-CM    1. Strain of muscle, fascia and tendon of left hip, subsequent encounter  S76.012D       2. Left hip pain  M25.552                      Subjective: Patient reports that she felt pretty good after her last PT session with no significant soreness after the session. However, she has been having pain in her hip over the last 2 days.       Objective: See treatment diary below        Assessment: Held Gr. V L hip LAD today due to patient presenting with no LLD today. Continued with manual interventions to address L hip IR hypomobility, and patient responded well to manual therapy with an improvement in IR PROM post-tx compared to presentation. Patient reported mild muscular soreness when ambulating after manual therapy that resolved after prone quad stretching and 1/2 kneel hip flexor stretch. Continued with hip mobility/stability program and held progressions due to baseline soreness. Patient tolerated treatment well and completed program in a pain-free range. She reported alleviation of symptoms post-tx. Patient would benefit from continued PT to progress treatment as appropriate to achieve goals.      Plan: Continue per plan of care.      Precautions: none    HEP: GS, bridges, supine clams (blue), supine hip ADD isometrics, seated heel squeezes  Manuals       L hip LAD KP Gr. V KP Gr. V (x2) KP Gr. IV, Gr. V (X2) KP Gr. IV, Gr. V (x1) KP Gr. IV, Gr. V (x1) KP Gr. IV, KP Gr. V (x1) KP Gr. IV, held Gr. V      L hip inferolateral glides KP Gr. III KP Gr. III-IV KP Gr. III-IV KP Gr. III-IV KP Gr. III-IV KP Gr. III-IV KP Gr. III-IV      Percussive STM to L gluts/lower lumbar paraspinals   KP np                        Neuro Re-Ed                                                                    "                                     Ther Ex             Rec bike MEDEL NV 5' 5' 5' 5' 5' 5'      Prone quad stretch       30\"x3      Hooklying GS 5\"x30 HEP 5\"x30 HEP HEP HEP        Bridges 5\"x30 HEP 5\"x30 5\"x30 (x2) 5\"x30 5\"x30 5\"x35 5\"x35      Supine clams 5\"x30 GTB HEP 5\"x30 blue 5\"x30 blue 5\"x30 blue 5\"x30 black 5\"x35 black 5\"x35 black      Supine hip ADD isometrics 5\"x30 HEP 5\"x30 5\"x30 5\"x30 5\"x35 5\"x35 5\"x35      Seated heel squeezes 5\"x30 HEP 5\"x30 HEP HEP         Prone heel squeezes NV 5\"x30 5\"x30 5\"x30 5\"x35 5\"x35 5\"x35      Seated piriformis stretch   30\"x3          Half kneeling hip flexor stretch      2\"x10 b/l 5\"x10 b/l      Ther Activity                                       Gait Training                                       Modalities             MHP L hip PRN    X10' in R s/l post tx X10' in R s/l post tx X10' in R s/l post tx X10' in R s/l post tx                                  "

## 2024-12-02 ENCOUNTER — OFFICE VISIT (OUTPATIENT)
Dept: PHYSICAL THERAPY | Facility: MEDICAL CENTER | Age: 24
End: 2024-12-02
Payer: COMMERCIAL

## 2024-12-02 DIAGNOSIS — S76.012D STRAIN OF MUSCLE, FASCIA AND TENDON OF LEFT HIP, SUBSEQUENT ENCOUNTER: Primary | ICD-10-CM

## 2024-12-02 DIAGNOSIS — M25.552 LEFT HIP PAIN: ICD-10-CM

## 2024-12-02 PROCEDURE — 97140 MANUAL THERAPY 1/> REGIONS: CPT | Performed by: PHYSICAL THERAPIST

## 2024-12-02 NOTE — PROGRESS NOTES
Daily Note     Today's date: 2024  Patient name: Tory Christine  : 2000  MRN: [id number]  Referring provider: Nora Stanton MD  Dx:   Encounter Diagnosis     ICD-10-CM    1. Strain of muscle, fascia and tendon of left hip, subsequent encounter  S76.012D       2. Left hip pain  M25.552                      Subjective: Patient reports that she felt relief after last session with less pain. She had some soreness yesterday, but she is feeling good again today. She notes that she is feeling better overall since beginning PT.      Objective: See treatment diary below      Assessment: Held Gr. V L hip LAD due to patient presenting with no LLD today. Continued with hip mobilizations to address very mild remaining limitation in end-range L hip IR PROM in prone, and patient tolerated manual therapy well. Able to progress bridges to add ABD/ADD resistance, which demonstrates an improvement in multiplanar hip strength. Also added resisted hip IR to further improve multiplanar hip stability. Patient was educated to add 1/2 kneel hip flexor stretch to HEP to improve hip mobility. Patient tolerated treatment well and completed program without pain. Patient would benefit from continued PT to further improve hip mobility and strength to achieve goals.      Plan: Continue per plan of care.      Precautions: none    HEP: GS, bridges, supine clams (blue), supine hip ADD isometrics, seated heel squeezes, 1/2 kneel hip flexor stretch  Manuals  12/     L hip LAD KP Gr. V KP Gr. V (x2) KP Gr. IV, Gr. V (X2) KP Gr. IV, Gr. V (x1) KP Gr. IV, Gr. V (x1) KP Gr. IV, KP Gr. V (x1) KP Gr. IV, held Gr. V KP Gr. IV, held Gr. V     L hip inferolateral glides KP Gr. III KP Gr. III-IV KP Gr. III-IV KP Gr. III-IV KP Gr. III-IV KP Gr. III-IV KP Gr. III-IV KP Gr. III-IV     Percussive STM to L gluts/lower lumbar paraspinals   KP np                        Neuro Re-Ed                         "                                                                                Ther Ex             Rec bike MEDEL NV 5' 5' 5' 5' 5' 5' 5'     Prone quad stretch       30\"x3 30\"x4     Hooklying GS 5\"x30 HEP 5\"x30 HEP HEP HEP        Bridges 5\"x30 HEP 5\"x30 5\"x30 (x2) 5\"x30 5\"x30 5\"x35 5\"x35 below     Supine clams 5\"x30 GTB HEP 5\"x30 blue 5\"x30 blue 5\"x30 blue 5\"x30 black 5\"x35 black 5\"x35 black 5\"x20 blue + bridge     Supine hip ADD isometrics 5\"x30 HEP 5\"x30 5\"x30 5\"x30 5\"x35 5\"x35 5\"x35 5\"x20 + bridge     Seated heel squeezes 5\"x30 HEP 5\"x30 HEP HEP         Prone heel squeezes NV 5\"x30 5\"x30 5\"x30 5\"x35 5\"x35 5\"x35 5\"x40     Seated hip IR        5\"x20 black     Seated piriformis stretch   30\"x3          Half kneeling hip flexor stretch      2\"x10 b/l 5\"x10 b/l 5\"x20 b/l     Ther Activity                                       Gait Training                                       Modalities             MHP L hip PRN    X10' in R s/l post tx X10' in R s/l post tx X10' in R s/l post tx X10' in R s/l post tx X10' in R s/l post tx                                   "

## 2024-12-05 ENCOUNTER — OFFICE VISIT (OUTPATIENT)
Dept: PHYSICAL THERAPY | Facility: MEDICAL CENTER | Age: 24
End: 2024-12-05
Payer: COMMERCIAL

## 2024-12-05 DIAGNOSIS — M25.552 LEFT HIP PAIN: ICD-10-CM

## 2024-12-05 DIAGNOSIS — S76.012D STRAIN OF MUSCLE, FASCIA AND TENDON OF LEFT HIP, SUBSEQUENT ENCOUNTER: Primary | ICD-10-CM

## 2024-12-05 PROCEDURE — 97140 MANUAL THERAPY 1/> REGIONS: CPT | Performed by: PHYSICAL THERAPIST

## 2024-12-05 NOTE — PROGRESS NOTES
Daily Note     Today's date: 2024  Patient name: Tory Christine  : 2000  MRN: [id number]  Referring provider: Nora Stanton MD  Dx:   Encounter Diagnosis     ICD-10-CM    1. Strain of muscle, fascia and tendon of left hip, subsequent encounter  S76.012D       2. Left hip pain  M25.552                      Subjective: Patient reports that she had some general muscle soreness yesterday, but she has not had any pain in her hip over the last few days. She feels that she is improving overall.      Objective: See treatment diary below      Assessment: Continued to hold on Gr. V L hip LAD due to patient presenting with no LLD today. Continued with Gr. IV L hip LAD and L hip inferolateral glides to address very mild remaining L hip IR hypomobility. Patient responded well to manual therapy with improvements in L hip IR PROM post-tx. Able to increase resistance for supine ABD bridges, which demonstrates good progress toward goals. Patient tolerated treatment well and completed program without pain. Patient would benefit from continued PT to further progress treatment to achieve goals.      Plan: Continue per plan of care. Progress HEP nv.     Precautions: none    HEP: GS, bridges, supine clams (blue), supine hip ADD isometrics, seated heel squeezes, 1/2 kneel hip flexor stretch  Manuals     L hip LAD KP Gr. V KP Gr. V (x2) KP Gr. IV, Gr. V (X2) KP Gr. IV, Gr. V (x1) KP Gr. IV, Gr. V (x1) KP Gr. IV, KP Gr. V (x1) KP Gr. IV, held Gr. V KP Gr. IV, held Gr. V KP Gr. IV, held Gr. V    L hip inferolateral glides KP Gr. III KP Gr. III-IV KP Gr. III-IV KP Gr. III-IV KP Gr. III-IV KP Gr. III-IV KP Gr. III-IV KP Gr. III-IV KP Gr. III-IV    Percussive STM to L gluts/lower lumbar paraspinals   KP np                        Neuro Re-Ed                                                                                                        Ther Ex             Rec bike  "MEDEL NV 5' 5' 5' 5' 5' 5' 5' 5'    Prone quad stretch       30\"x3 30\"x4 30\"x4    Hooklying GS 5\"x30 HEP 5\"x30 HEP HEP HEP        Bridges 5\"x30 HEP 5\"x30 5\"x30 (x2) 5\"x30 5\"x30 5\"x35 5\"x35 below     Supine clams 5\"x30 GTB HEP 5\"x30 blue 5\"x30 blue 5\"x30 blue 5\"x30 black 5\"x35 black 5\"x35 black 5\"x20 blue + bridge 5\"x30 black + bridge    Supine hip ADD isometrics 5\"x30 HEP 5\"x30 5\"x30 5\"x30 5\"x35 5\"x35 5\"x35 5\"x20 + bridge 5\"x30 + bridge    Seated heel squeezes 5\"x30 HEP 5\"x30 HEP HEP         Prone heel squeezes NV 5\"x30 5\"x30 5\"x30 5\"x35 5\"x35 5\"x35 5\"x40 5\"x40    Seated hip IR        5\"x20 black 5\"x30 black    Seated piriformis stretch   30\"x3          Half kneeling hip flexor stretch      2\"x10 b/l 5\"x10 b/l 5\"x20 b/l 5\"x20 b/l    Ther Activity                                       Gait Training                                       Modalities             MHP L hip PRN    X10' in R s/l post tx X10' in R s/l post tx X10' in R s/l post tx X10' in R s/l post tx X10' in R s/l post tx X10' in R s/l post tx                                    "

## 2024-12-09 ENCOUNTER — OFFICE VISIT (OUTPATIENT)
Dept: PHYSICAL THERAPY | Facility: MEDICAL CENTER | Age: 24
End: 2024-12-09
Payer: COMMERCIAL

## 2024-12-09 DIAGNOSIS — M25.552 LEFT HIP PAIN: ICD-10-CM

## 2024-12-09 DIAGNOSIS — S76.012D STRAIN OF MUSCLE, FASCIA AND TENDON OF LEFT HIP, SUBSEQUENT ENCOUNTER: Primary | ICD-10-CM

## 2024-12-09 PROCEDURE — 97140 MANUAL THERAPY 1/> REGIONS: CPT | Performed by: PHYSICAL THERAPIST

## 2024-12-09 NOTE — PROGRESS NOTES
Daily Note     Today's date: 2024  Patient name: Tory Christine  : 2000  MRN: [id number]  Referring provider: Nora Stanton MD  Dx:   Encounter Diagnosis     ICD-10-CM    1. Strain of muscle, fascia and tendon of left hip, subsequent encounter  S76.012D       2. Left hip pain  M25.552                      Subjective: Patient reports that she felt good after her last PT session. She had some muscular soreness yesterday, but she is feeling better again today. She continues to feel improved overall since beginning PT.      Objective: See treatment diary below      Assessment: Patient presented with no LLD today. Continued with L hip mobilizations to address very mild remaining limitations in end-range L hip IR PROM, and patient responded well to manual therapy with improvements in L hip IR PROM post-tx. Continued with hip mobility and stability exercises, and patient completed program in a pain-free range. Patient was educated to utilize MHP at home if she develops muscular soreness. Patient tolerated treatment well and would benefit from continued PT to address impairments to achieve goals.     Plan: Continue per plan of care.      Precautions: none    HEP: ABD bridges (black), ADD bridges, seated heel squeezes, 1/2 kneel hip flexor stretch  Manuals    L hip LAD KP Gr. V KP Gr. V (x2) KP Gr. IV, Gr. V (X2) KP Gr. IV, Gr. V (x1) KP Gr. IV, Gr. V (x1) KP Gr. IV, KP Gr. V (x1) KP Gr. IV, held Gr. V KP Gr. IV, held Gr. V KP Gr. IV, held Gr. V KP Gr. IV, held Gr. V   L hip inferolateral glides KP Gr. III KP Gr. III-IV KP Gr. III-IV KP Gr. III-IV KP Gr. III-IV KP Gr. III-IV KP Gr. III-IV KP Gr. III-IV KP Gr. III-IV KP Gr. III-IV   Percussive STM to L gluts/lower lumbar paraspinals   KP np                        Neuro Re-Ed                                                                                                        Ther Ex            "  Rec bike MEDEL NV 5' 5' 5' 5' 5' 5' 5' 5' 5'   Prone quad stretch       30\"x3 30\"x4 30\"x4 30\"x4   Hooklying GS 5\"x30 HEP 5\"x30 HEP HEP HEP        Bridges 5\"x30 HEP 5\"x30 5\"x30 (x2) 5\"x30 5\"x30 5\"x35 5\"x35 below     Supine clams 5\"x30 GTB HEP 5\"x30 blue 5\"x30 blue 5\"x30 blue 5\"x30 black 5\"x35 black 5\"x35 black 5\"x20 blue + bridge 5\"x30 black + bridge 5\"x30 black + bridge   Supine hip ADD isometrics 5\"x30 HEP 5\"x30 5\"x30 5\"x30 5\"x35 5\"x35 5\"x35 5\"x20 + bridge 5\"x30 + bridge 5\"x30 + bridge   Seated heel squeezes 5\"x30 HEP 5\"x30 HEP HEP         Prone heel squeezes NV 5\"x30 5\"x30 5\"x30 5\"x35 5\"x35 5\"x35 5\"x40 5\"x40 5\"x40   Seated hip IR        5\"x20 black 5\"x30 black 5\"x20 black   Seated piriformis stretch   30\"x3          Half kneeling hip flexor stretch      2\"x10 b/l 5\"x10 b/l 5\"x20 b/l 5\"x20 b/l 5\"x20 b/l   Ther Activity                                       Gait Training                                       Modalities             MHP L hip PRN    X10' in R s/l post tx X10' in R s/l post tx X10' in R s/l post tx X10' in R s/l post tx X10' in R s/l post tx X10' in R s/l post tx At home                                     "

## 2024-12-12 ENCOUNTER — OFFICE VISIT (OUTPATIENT)
Dept: PHYSICAL THERAPY | Facility: MEDICAL CENTER | Age: 24
End: 2024-12-12
Payer: COMMERCIAL

## 2024-12-12 DIAGNOSIS — S76.012D STRAIN OF MUSCLE, FASCIA AND TENDON OF LEFT HIP, SUBSEQUENT ENCOUNTER: Primary | ICD-10-CM

## 2024-12-12 DIAGNOSIS — M25.552 LEFT HIP PAIN: ICD-10-CM

## 2024-12-12 PROCEDURE — 97140 MANUAL THERAPY 1/> REGIONS: CPT | Performed by: PHYSICAL THERAPIST

## 2024-12-12 NOTE — PROGRESS NOTES
Daily Note     Today's date: 2024  Patient name: Tory Christine  : 2000  MRN: [id number]  Referring provider: Nora Stanton MD  Dx:   Encounter Diagnosis     ICD-10-CM    1. Strain of muscle, fascia and tendon of left hip, subsequent encounter  S76.012D       2. Left hip pain  M25.552                      Subjective: Patient reports that she has some soreness after starting a new workout routine over the past few days, but she is feeling significantly improved overall. She is able to perform lunges and is able to run without difficulty.      Objective: See treatment diary below      Assessment: No LLD present today. Continued with L hip mobilizations to address very mild remaining limitation in end-range L hip IR PROM, and patient responded well to manuals with improvements in L hip IR PROM post-tx. Continued with hip mobility and stability TE, and patient demonstrated good technique with all interventions. Patient completed program without pain. Patient is demonstrating excellent progress toward her goals and would benefit from continued PT to maximize independence with HEP prior to upcoming d/c.       Plan: Continue per plan of care.  Potential discharge next visit.     Precautions: none    HEP: ABD bridges (black), ADD bridges, seated heel squeezes, 1/2 kneel hip flexor stretch  Manuals    L hip LAD KP Gr. IV, held Gr. V KP Gr. V KP Gr. V (x2) KP Gr. IV, Gr. V (X2) KP Gr. IV, Gr. V (x1) KP Gr. IV, Gr. V (x1) KP Gr. IV, KP Gr. V (x1) KP Gr. IV, held Gr. V KP Gr. IV, held Gr. V KP Gr. IV, held Gr. V KP Gr. IV, held Gr. V   L hip inferolateral glides KP Gr. III-IV KP Gr. III KP Gr. III-IV KP Gr. III-IV KP Gr. III-IV KP Gr. III-IV KP Gr. III-IV KP Gr. III-IV KP Gr. III-IV KP Gr. III-IV KP Gr. III-IV   Percussive STM to L gluts/lower lumbar paraspinals    KP np                         Neuro Re-Ed                                        "                                                                         Ther Ex              Rec bike MEDEL 5' NV 5' 5' 5' 5' 5' 5' 5' 5' 5'   Prone quad stretch 30\"x4       30\"x3 30\"x4 30\"x4 30\"x4   Hooklying GS  5\"x30 HEP 5\"x30 HEP HEP HEP        Bridges  5\"x30 HEP 5\"x30 5\"x30 (x2) 5\"x30 5\"x30 5\"x35 5\"x35 below     Supine clams X30 black + bridge 5\"x30 GTB HEP 5\"x30 blue 5\"x30 blue 5\"x30 blue 5\"x30 black 5\"x35 black 5\"x35 black 5\"x20 blue + bridge 5\"x30 black + bridge 5\"x30 black + bridge   Supine hip ADD isometrics X30 + bridge 5\"x30 HEP 5\"x30 5\"x30 5\"x30 5\"x35 5\"x35 5\"x35 5\"x20 + bridge 5\"x30 + bridge 5\"x30 + bridge   Seated heel squeezes  5\"x30 HEP 5\"x30 HEP HEP         Prone heel squeezes 5\"x40 NV 5\"x30 5\"x30 5\"x30 5\"x35 5\"x35 5\"x35 5\"x40 5\"x40 5\"x40   Seated hip IR 5\"x30 black        5\"x20 black 5\"x30 black 5\"x20 black   Seated piriformis stretch    30\"x3          Half kneeling hip flexor stretch 5\"x20 b/l      2\"x10 b/l 5\"x10 b/l 5\"x20 b/l 5\"x20 b/l 5\"x20 b/l   Ther Activity                                          Gait Training                                          Modalities              MHP L hip PRN X10' in R s/l post tx    X10' in R s/l post tx X10' in R s/l post tx X10' in R s/l post tx X10' in R s/l post tx X10' in R s/l post tx X10' in R s/l post tx At home                                        "

## 2024-12-16 ENCOUNTER — OFFICE VISIT (OUTPATIENT)
Dept: PHYSICAL THERAPY | Facility: MEDICAL CENTER | Age: 24
End: 2024-12-16
Payer: COMMERCIAL

## 2024-12-16 DIAGNOSIS — M25.552 LEFT HIP PAIN: ICD-10-CM

## 2024-12-16 DIAGNOSIS — S76.012D STRAIN OF MUSCLE, FASCIA AND TENDON OF LEFT HIP, SUBSEQUENT ENCOUNTER: Primary | ICD-10-CM

## 2024-12-16 PROCEDURE — 97140 MANUAL THERAPY 1/> REGIONS: CPT | Performed by: PHYSICAL THERAPIST

## 2024-12-16 NOTE — PROGRESS NOTES
Discharge Summary    Today's date: 2024  Patient name: Tory Christine  : 2000  MRN: [id number]  Referring provider: Nora Stanton MD  Dx:   Encounter Diagnosis     ICD-10-CM    1. Strain of muscle, fascia and tendon of left hip, subsequent encounter  S76.012D       2. Left hip pain  M25.552                      Subjective: Patient reports that her hip is feeling much improved. She is able to sit and stand for longer periods. She is also able to participate in her exercise routine and complete all of her daily activities without limitation. She is very pleased with her overall progress and feels ready to be discharged to her home exercise program.       Objective: See treatment diary below    Hip IR PROM: WFL and symmetrical bilaterally in prone    L hip ABD strength: 55    Assessment: Patient has demonstrated excellent progress with improving her L hip IR mobility since her initial visit, which has increased her sitting tolerance. Patient has also demonstrated good progress with improving her L hip strength, which has facilitated a reduction in her symptoms when participating in her exercise routine and has increased her standing tolerance. Patient has met all of her goals for PT, and she is independent in an HEP for continued hip mobility and strengthening. Patient tolerated all treatment well today and completed program without pain. Patient is agreeable to be discharged to her HEP.     Goals  STG:  Patient will be independent with home exercise program.- met   Patient will increase L hip IR PROM to be comparable to the contralateral side to reduce stress on L SIJ when exercising.- met  LTG:  Patient will increase L hip strength to at least 4+/5 in all planes to be able to participate in [employer] and [employer].- met  Patient will be able to sit for longer periods.- met  Patient will be able to stand for longer periods.- met  Patient will be able to manage symptoms independently.- met  "    Plan: Discharge to HEP.     Precautions: none    HEP: ABD bridges (black), ADD bridges, seated heel squeezes, 1/2 kneel hip flexor stretch, seated IR tband  Manuals 12/12 12/16   L hip LAD KP Gr. IV, held Gr. V KP Gr. IV, held Gr. V   L hip inferolateral glides KP Gr. III-IV KP Gr. III-IV        Neuro Re-Ed                                        Ther Ex     Rec bike MEDEL 5' 5'   Prone quad stretch 30\"x4 30\"x4   Supine clams X30 black + bridge X30 black + bridge   Supine hip ADD isometrics X30 + bridge X30 + bridge   Prone heel squeezes 5\"x40 5\"x40   Seated hip IR 5\"x30 black 5\"x30 black   Half kneeling hip flexor stretch 5\"x20 b/l 5\"x20 b/l   Ther Activity               Gait Training               Modalities     MHP L hip PRN X10' in R s/l post tx At home                                 "